# Patient Record
Sex: MALE | Race: BLACK OR AFRICAN AMERICAN | NOT HISPANIC OR LATINO | Employment: FULL TIME | URBAN - METROPOLITAN AREA
[De-identification: names, ages, dates, MRNs, and addresses within clinical notes are randomized per-mention and may not be internally consistent; named-entity substitution may affect disease eponyms.]

---

## 2020-01-15 ENCOUNTER — APPOINTMENT (OUTPATIENT)
Dept: GENERAL RADIOLOGY | Facility: HOSPITAL | Age: 40
End: 2020-01-15

## 2020-01-15 ENCOUNTER — HOSPITAL ENCOUNTER (EMERGENCY)
Facility: HOSPITAL | Age: 40
Discharge: HOME | End: 2020-01-15
Attending: EMERGENCY MEDICINE | Admitting: EMERGENCY MEDICINE

## 2020-01-15 VITALS
WEIGHT: 160 LBS | SYSTOLIC BLOOD PRESSURE: 144 MMHG | HEART RATE: 75 BPM | HEIGHT: 72 IN | RESPIRATION RATE: 18 BRPM | BODY MASS INDEX: 21.67 KG/M2 | DIASTOLIC BLOOD PRESSURE: 103 MMHG | OXYGEN SATURATION: 98 % | TEMPERATURE: 96.8 F

## 2020-01-15 DIAGNOSIS — M54.50 ACUTE BILATERAL LOW BACK PAIN WITHOUT SCIATICA: Primary | ICD-10-CM

## 2020-01-15 PROCEDURE — 25010000002 KETOROLAC TROMETHAMINE PER 15 MG: Performed by: EMERGENCY MEDICINE

## 2020-01-15 PROCEDURE — 72110 X-RAY EXAM L-2 SPINE 4/>VWS: CPT

## 2020-01-15 PROCEDURE — 96372 THER/PROPH/DIAG INJ SC/IM: CPT

## 2020-01-15 PROCEDURE — 99283 EMERGENCY DEPT VISIT LOW MDM: CPT

## 2020-01-15 RX ORDER — KETOROLAC TROMETHAMINE 30 MG/ML
30 INJECTION, SOLUTION INTRAMUSCULAR; INTRAVENOUS ONCE
Status: COMPLETED | OUTPATIENT
Start: 2020-01-15 | End: 2020-01-15

## 2020-01-15 RX ORDER — METHYLPREDNISOLONE 4 MG/1
TABLET ORAL
Qty: 21 TABLET | Refills: 0 | Status: SHIPPED | OUTPATIENT
Start: 2020-01-15

## 2020-01-15 RX ORDER — HYDROCODONE BITARTRATE AND ACETAMINOPHEN 7.5; 325 MG/1; MG/1
1 TABLET ORAL ONCE
Status: COMPLETED | OUTPATIENT
Start: 2020-01-15 | End: 2020-01-15

## 2020-01-15 RX ORDER — CYCLOBENZAPRINE HCL 10 MG
10 TABLET ORAL 3 TIMES DAILY PRN
Qty: 30 TABLET | Refills: 0 | Status: SHIPPED | OUTPATIENT
Start: 2020-01-15

## 2020-01-15 RX ADMIN — KETOROLAC TROMETHAMINE 30 MG: 30 INJECTION, SOLUTION INTRAMUSCULAR at 22:41

## 2020-01-15 RX ADMIN — HYDROCODONE BITARTRATE AND ACETAMINOPHEN 1 TABLET: 7.5; 325 TABLET ORAL at 22:39

## 2020-01-16 NOTE — ED PROVIDER NOTES
EMERGENCY DEPARTMENT ENCOUNTER    Room Number:  34/34  Date seen:  1/15/2020  Time seen: 9:27 PM  PCP: Provider, No Known  Historian: Pt      HPI:  Chief Complaint: Back pain  A complete HPI/ROS/PMH/PSH/SH/FH are unobtainable due to: none  Context: Roger Avalos is a 39 y.o. male who presents to the ED c/o severe sharp constant lower back pain beginning 5 days ago worsening over the last few days. He also reports 'tingling' radiating down anterior portion of R leg with palpation of lower back. Pain is exacerbated with sitting up and neck flexion. He denies any recent trauma or injury. Pt had back XR multiple years ago with results that showed hernia discs in L spine. No other complaints at this time. Pt admits to hx of incarceration (intermittent sentences; weekends). No recent travel. No hx of IV drug use. No hx of ca.  No bowel or bladder incontinence.       PAST MEDICAL HISTORY  Active Ambulatory Problems     Diagnosis Date Noted   • No Active Ambulatory Problems     Resolved Ambulatory Problems     Diagnosis Date Noted   • No Resolved Ambulatory Problems     No Additional Past Medical History         PAST SURGICAL HISTORY  History reviewed. No pertinent surgical history.      FAMILY HISTORY  History reviewed. No pertinent family history.      SOCIAL HISTORY  Social History     Socioeconomic History   • Marital status: Single     Spouse name: Not on file   • Number of children: Not on file   • Years of education: Not on file   • Highest education level: Not on file   Tobacco Use   • Smoking status: Current Every Day Smoker     Types: Electronic Cigarette   Substance and Sexual Activity   • Alcohol use: Yes     Alcohol/week: 2.0 standard drinks     Types: 2 Cans of beer per week     Comment: 2 beers every evening   • Drug use: Never         ALLERGIES  Patient has no known allergies.        REVIEW OF SYSTEMS  Review of Systems   Constitutional: Negative for diaphoresis and fever.   HENT: Negative for  congestion.    Eyes: Negative for visual disturbance.   Respiratory: Negative for shortness of breath.    Cardiovascular: Negative for palpitations.   Gastrointestinal: Negative for blood in stool and vomiting.   Endocrine: Negative for polyuria.   Genitourinary: Negative for flank pain.   Musculoskeletal: Positive for back pain (lower; radiating to front of RLE). Negative for joint swelling.   Skin: Negative for wound.   Neurological: Negative for seizures.   Hematological: Negative for adenopathy.   Psychiatric/Behavioral: Negative for sleep disturbance.            PHYSICAL EXAM  ED Triage Vitals [01/15/20 2045]   Temp Heart Rate Resp BP SpO2   96.8 °F (36 °C) 110 19 -- 99 %      Temp src Heart Rate Source Patient Position BP Location FiO2 (%)   Tympanic -- -- -- --         GENERAL: awake and alert, no acute distress  HENT: nares patent  EYES: no scleral icterus  CV: regular rhythm, normal rate  RESPIRATORY: normal effort  ABDOMEN: soft  MUSCULOSKELETAL: no deformity, bilateral L paraspinous tenderness, no C/T spine tenderness  NEURO: alert, moves all extremities, follows commands  5/5 strength with hip flexion, knee flex/ext, plantarflexion, and dorsiflexion of the feet BLE. Normal sensation to light touch and pin prick throughout BLE. 2+ DTR at the knee BLE.    SKIN: warm, dry    Vital signs and nursing notes reviewed.            RADIOLOGY  Xr Spine Lumbar Complete 4+vw    Result Date: 1/15/2020  6 VIEWS LUMBAR SPINE  HISTORY: Low back pain  COMPARISON: None available.  FINDINGS: No acute fracture or subluxation of the lumbar spine is identified. There is mild retrolisthesis of L3 on L4, as well as L2 on L3. There is some straightening of normal lumbar curvature, which can be associated with muscle spasm. May be some minimal lumbar scoliosis, convexity to the right. Sacroiliac joints and intervertebral disc spaces appear well-preserved.      There is some straightening of normal lumbar curvature. While this may  be related to patient positioning, muscle spasm is not excluded. No other acute findings.  This report was finalized on 1/15/2020 10:40 PM by Dr. Lary Larose M.D.        Ordered the above noted radiological studies. Reviewed by me in PACS.        PROCEDURES  Procedures        MEDICATIONS GIVEN IN ER  Medications   ketorolac (TORADOL) injection 30 mg (30 mg Intramuscular Given 1/15/20 9653)   HYDROcodone-acetaminophen (NORCO) 7.5-325 MG per tablet 1 tablet (1 tablet Oral Given 1/15/20 1520)                     MEDICAL DECISION MAKING and ED Course         MDM       38 yo M w/ low back pain, mild radicular symptoms involving the right anterior thigh, no red flag historical symptoms, and normal neurologic exam.  Plain films of the lumbar spine reveals some straightening which is likely secondary to muscle spasm, otherwise negative acute.  He had some relief here after Toradol and hydrocodone.  Home with Medrol Dosepak and Flexeril PRN.  Follow-up with neurosurgery spine if symptoms persist, return precautions were discussed.    DIAGNOSIS  Final diagnoses:   Acute bilateral low back pain without sciatica         DISPOSITION  DISCHARGE    Patient discharged in stable condition.    Reviewed implications of results, diagnosis, meds, responsibility to follow up, warning signs and symptoms of possible worsening, potential complications and reasons to return to ER.    Patient/Family voiced understanding of above instructions.    Discussed plan for discharge, as there is no emergent indication for admission.  Pt/family is agreeable and understands need for follow up and repeat testing.  Pt is aware that discharge does not mean that nothing is wrong but it indicates no emergency is present that requires admission and they must continue care with follow-up as given below or physician of their choice.     FOLLOW-UP  Daniella Hess, APRN  [address] [id number] [phone]               Medication  List      New Prescriptions    cyclobenzaprine 10 MG tablet  Commonly known as:  FLEXERIL  Take 1 tablet by mouth 3 (Three) Times a Day As Needed for Muscle Spasms.     methylPREDNISolone 4 MG tablet  Commonly known as:  MEDROL (ZEYAD)  Take as directed on package instructions.                      Latest Documented Vital Signs:  As of 11:18 PM  BP- (!) 144/103 HR- 75 Temp- 96.8 °F (36 °C) (Tympanic) O2 sat- 98%        --  Documentation assistance provided by sherwin Garcia for Dr. ANJALI Walton MD.  Information recorded by the scribe was done at my direction and has been verified and validated by me.      Please note that portions of this were completed with a voice recognition program.              Summer Garcia  01/15/20 8870       Raul Walton MD  01/15/20 2831

## 2020-01-16 NOTE — ED TRIAGE NOTES
Pt arrives to the ER c/o back pain. Pt states he pushed on his back while at work, causing he leg to go numb. Pt rates pain 10/10 at this time.

## 2020-01-17 ENCOUNTER — TELEPHONE (OUTPATIENT)
Dept: NEUROSURGERY | Facility: CLINIC | Age: 40
End: 2020-01-17

## 2020-01-17 NOTE — TELEPHONE ENCOUNTER
Daniella had sent me a message on this patient with his attached ER note from the ER doctor(Dr. Walton). If he is still having pain, Daniella would like for patient to come in to be evaluated. I had to call and АННА